# Patient Record
Sex: FEMALE | Race: BLACK OR AFRICAN AMERICAN | Employment: UNEMPLOYED | URBAN - METROPOLITAN AREA
[De-identification: names, ages, dates, MRNs, and addresses within clinical notes are randomized per-mention and may not be internally consistent; named-entity substitution may affect disease eponyms.]

---

## 2022-05-14 ENCOUNTER — APPOINTMENT (OUTPATIENT)
Dept: CT IMAGING | Age: 22
End: 2022-05-14
Payer: COMMERCIAL

## 2022-05-14 ENCOUNTER — HOSPITAL ENCOUNTER (EMERGENCY)
Age: 22
Discharge: HOME | End: 2022-05-15
Attending: EMERGENCY MEDICINE
Payer: COMMERCIAL

## 2022-05-14 DIAGNOSIS — S06.0X1A CONCUSSION WITH LOSS OF CONSCIOUSNESS OF 30 MINUTES OR LESS, INITIAL ENCOUNTER: Primary | ICD-10-CM

## 2022-05-14 DIAGNOSIS — S01.81XA FACIAL LACERATION, INITIAL ENCOUNTER: ICD-10-CM
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
## 2022-05-14 PROCEDURE — 72125 CT NECK SPINE W/O DYE: CPT

## 2022-05-14 PROCEDURE — 70450 CT HEAD/BRAIN W/O DYE: CPT

## 2022-05-14 PROCEDURE — 12013 RPR F/E/E/N/L/M 2.6-5.0 CM: CPT

## 2022-05-14 PROCEDURE — 99284 EMERGENCY DEPT VISIT MOD MDM: CPT

## 2022-05-14 ASSESSMENT — ENCOUNTER SYMPTOMS
VOMITING: 0
EYE REDNESS: 0
WHEEZING: 0
NAUSEA: 0
EYE PAIN: 0
EYE DISCHARGE: 0
SORE THROAT: 0
DIARRHEA: 0
COUGH: 0
SHORTNESS OF BREATH: 0
ABDOMINAL DISTENTION: 0
BACK PAIN: 0
SINUS PRESSURE: 0

## 2022-05-15 VITALS
DIASTOLIC BLOOD PRESSURE: 70 MMHG | RESPIRATION RATE: 16 BRPM | BODY MASS INDEX: 22.58 KG/M2 | TEMPERATURE: 98.2 F | OXYGEN SATURATION: 99 % | HEIGHT: 60 IN | WEIGHT: 115 LBS | SYSTOLIC BLOOD PRESSURE: 109 MMHG | HEART RATE: 106 BPM

## 2022-05-15 NOTE — ED NOTES
Report to [facility]. Care of patient relinquished.      Raghav Rees, RN  05/14/22 0512

## 2022-05-15 NOTE — ED NOTES
This RN received report from Henrique Santana [facility].      Guillermina Rodriguez RN  05/15/22 8979

## 2022-05-15 NOTE — ED NOTES
Report given to [facility] ERNESTINE ESPINOZA.       Brian Vu RN  05/15/22 0129

## 2022-05-15 NOTE — ED PROVIDER NOTES
The history is provided by the patient, a friend, medical records and the EMS personnel. 76-year-old female presents emergency department with complaint of fall and loss of consciousness per EMS. Reportedly patient was at the bowling fell down hit her head was reportedly unconscious for approximately 1 minute. Patient is denying this at this time. She does elicit having a couple alcoholic drinks and was hanging out with friends. She otherwise denies having any acute complaints at this time. EMS states it was a witnessed fall and that is why they were called. Otherwise patient has no other acute complaints this time. The patient presents with fall that has been going on for 1 hr. These symptoms are moderate in severity. Symptoms are made better by nothing. Symptoms are made worse by nothing. Associated symptoms include loc and hit head. Review of Systems   Constitutional: Negative for chills and fever. HENT: Negative for ear pain, sinus pressure and sore throat. Eyes: Negative for pain, discharge and redness. Respiratory: Negative for cough, shortness of breath and wheezing. Cardiovascular: Negative for chest pain. Gastrointestinal: Negative for abdominal distention, diarrhea, nausea and vomiting. Genitourinary: Negative for dysuria and frequency. Musculoskeletal: Negative for arthralgias and back pain. Skin: Negative for rash and wound. Neurological: Negative for weakness and headaches. Hematological: Negative for adenopathy. All other systems reviewed and are negative. Physical Exam  Vitals and nursing note reviewed. Constitutional:       General: She is not in acute distress. Appearance: Normal appearance. She is normal weight. She is not toxic-appearing. HENT:      Head: Normocephalic. Comments: Small laceration above left eyebrow  Eyes:      General: No scleral icterus.      Conjunctiva/sclera: Conjunctivae normal.   Cardiovascular:      Rate and Rhythm: Normal rate and regular rhythm. Pulses: Normal pulses. Heart sounds: Normal heart sounds. No murmur heard. No gallop. Pulmonary:      Effort: Pulmonary effort is normal. No respiratory distress. Breath sounds: Normal breath sounds. No wheezing or rales. Abdominal:      General: Abdomen is flat. Bowel sounds are normal. There is no distension. Palpations: Abdomen is soft. Tenderness: There is no abdominal tenderness. There is no guarding. Musculoskeletal:         General: No swelling, tenderness or deformity. Skin:     General: Skin is warm and dry. Capillary Refill: Capillary refill takes less than 2 seconds. Coloration: Skin is not jaundiced. Neurological:      General: No focal deficit present. Mental Status: She is alert. Cranial Nerves: No cranial nerve deficit. Sensory: No sensory deficit. Motor: No weakness. Comments: Alert and oriented to name and date of birth only. Psychiatric:         Mood and Affect: Mood normal.         Thought Content: Thought content normal.          Procedures       PROCEDURE NOTE  5/14/22       Time: 6727    LACERATION REPAIR  Risks, benefits and alternatives (for applicable procedures below) described. Performed By: Qamar Gannon MD.  Informed consent: Verbal consent obtained. The patient was and patient's father was counseled regarding the procedure in person, it's indications, risks, potential complications and alternatives and any questions were answered. Verbal consent was obtained. Laceration #: 1. Location: Above left eyebrow  Length: 3 cm. The wound area was irrigated with sterile saline and draped in a sterile fashion. Local Anesthesia:  not required/indicated. The wound was explored with the following results: Thickness: full thickness. no foreign body or tendon injury seen. Debridement: None. Undermining: None. Wound Margins Revised: None. Flaps Aligned: no.   The wound was closed Dermabond/tissue adhesive. Dressing:  no dressing required. There were no additional wounds requiring formal closure. MDM     81-year-old female presents emerged department complaint fall and loss of consciousness. Is confused here in the emergency department concern for possible concussion versus bleed at this time. CT scan of the head and neck unremarkable for any acute pathology. She does have a small laceration above the left eyelid which was repaired with Dermabond. Most recent tetanus was approximately 2 years ago according to family. Patient observed here in the emergency department safe to be discharged home following observation and advised to follow with her family doctor and return precautions. Patient safe for discharge from the emergency department. Did advise on return precautions to the emergency department. Did also advise follow-up with her primary care physician. Patient agreeable with the plan moving forward. ED Course as of 05/15/22 2311   Sun May 15, 2022   9035 Patient reevaluated at this time. Patient alert and orient x3. GCS 15. Neurologically intact. Using shared decision-making with the patient the patient's friend as well as the patient's father at bedside they would like to take the patient home. They will continue to monitor for any mental status changes overnight. They were given strict return precautions to return to emergency department if she experiences a return of family her symptoms that she was having earlier today. Strict return precautions given. Patient as well as the patient's father agree with plan. [CB]      ED Course User Index  [CB] Megha Moore DO          ED Course as of 05/15/22 2311   Sun May 15, 2022   2992 Patient reevaluated at this time. Patient alert and orient x3. GCS 15. Neurologically intact.   Using shared decision-making with the patient the patient's friend as well as the patient's father at bedside they would like to take the patient home. They will continue to monitor for any mental status changes overnight. They were given strict return precautions to return to emergency department if she experiences a return of family her symptoms that she was having earlier today. Strict return precautions given. Patient as well as the patient's father agree with plan. [CB]      ED Course User Index  [CB] Candice Rodriguez DO       --------------------------------------------- PAST HISTORY ---------------------------------------------  Past Medical History:  has no past medical history on file. Past Surgical History:  has no past surgical history on file. Social History:  reports that she has never smoked. She has never used smokeless tobacco.    Family History: family history is not on file. The patients home medications have been reviewed. Allergies: Patient has no known allergies. -------------------------------------------------- RESULTS -------------------------------------------------  Labs:  No results found for this visit on 05/14/22. Radiology:  CT Head WO Contrast   Final Result   No acute intracranial abnormality. CT Cervical Spine WO Contrast   Final Result   No acute abnormality of the cervical spine.             ------------------------- NURSING NOTES AND VITALS REVIEWED ---------------------------  Date / Time Roomed:  5/14/2022  9:38 PM  ED Bed Assignment:  11/11    The nursing notes within the ED encounter and vital signs as below have been reviewed.    /75   Pulse 115   Temp 98.1 °F (36.7 °C) (Oral)   Resp 14   Ht 5' (1.524 m)   Wt 115 lb (52.2 kg)   SpO2 98%   BMI 22.46 kg/m²   Oxygen Saturation Interpretation: Normal      ------------------------------------------ PROGRESS NOTES ------------------------------------------  9:52 PM EDT  I have spoken with the patient and discussed todays results, in addition to providing specific details for the plan of care and counseling regarding the diagnosis and prognosis. Their questions are answered at this time and they are agreeable with the plan. I discussed at length with them reasons for immediate return here for re evaluation. They will followup with their primary care physician by calling their office on Monday.      --------------------------------- ADDITIONAL PROVIDER NOTES ---------------------------------  At this time the patient is without objective evidence of an acute process requiring hospitalization or inpatient management. They have remained hemodynamically stable throughout their entire ED visit and are stable for discharge with outpatient follow-up. The plan has been discussed in detail and they are aware of the specific conditions for emergent return, as well as the importance of follow-up. New Prescriptions    No medications on file       Diagnosis:  1. Concussion with loss of consciousness of 30 minutes or less, initial encounter        Disposition:  Patient's disposition: Discharge to home  Patient's condition is stable.        Seble Swain MD  Resident  05/15/22 5631

## 2022-05-15 NOTE — ED NOTES
Patient arrived to ED via [facility]. Patient had a fall at the [facility] resulting in LOC and now combativeness and confusion. Patient did not want to come to ED so was pink slipped by [facility] because EMS was concerned for a brain bleed. Per Dr. Lisha Keen pink slip by [facility] for possible brain bleed is to be honored at this time. Staffing notified of need for C/O.      Linette Bull RN  05/14/22 2109